# Patient Record
Sex: FEMALE | Race: BLACK OR AFRICAN AMERICAN
[De-identification: names, ages, dates, MRNs, and addresses within clinical notes are randomized per-mention and may not be internally consistent; named-entity substitution may affect disease eponyms.]

---

## 2019-01-29 PROBLEM — Z00.00 ENCOUNTER FOR PREVENTIVE HEALTH EXAMINATION: Status: ACTIVE | Noted: 2019-01-29

## 2019-02-26 ENCOUNTER — APPOINTMENT (OUTPATIENT)
Dept: PLASTIC SURGERY | Facility: CLINIC | Age: 54
End: 2019-02-26

## 2019-09-11 ENCOUNTER — EMERGENCY (EMERGENCY)
Facility: HOSPITAL | Age: 54
LOS: 1 days | Discharge: HOME | End: 2019-09-11
Attending: EMERGENCY MEDICINE | Admitting: EMERGENCY MEDICINE
Payer: COMMERCIAL

## 2019-09-11 VITALS
TEMPERATURE: 98 F | HEART RATE: 87 BPM | RESPIRATION RATE: 18 BRPM | DIASTOLIC BLOOD PRESSURE: 74 MMHG | SYSTOLIC BLOOD PRESSURE: 108 MMHG | OXYGEN SATURATION: 98 %

## 2019-09-11 VITALS
DIASTOLIC BLOOD PRESSURE: 87 MMHG | SYSTOLIC BLOOD PRESSURE: 126 MMHG | RESPIRATION RATE: 18 BRPM | TEMPERATURE: 98 F | HEART RATE: 79 BPM | OXYGEN SATURATION: 98 % | WEIGHT: 190.04 LBS

## 2019-09-11 DIAGNOSIS — M25.532 PAIN IN LEFT WRIST: ICD-10-CM
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
## 2019-09-11 DIAGNOSIS — M25.531 PAIN IN RIGHT WRIST: ICD-10-CM
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
## 2019-09-11 DIAGNOSIS — M54.5 LOW BACK PAIN: ICD-10-CM
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
## 2019-09-11 DIAGNOSIS — R51 HEADACHE: ICD-10-CM

## 2019-09-11 PROCEDURE — 99284 EMERGENCY DEPT VISIT MOD MDM: CPT

## 2019-09-11 PROCEDURE — 70450 CT HEAD/BRAIN W/O DYE: CPT | Mod: 26

## 2019-09-11 RX ORDER — IBUPROFEN 200 MG
800 TABLET ORAL ONCE
Refills: 0 | Status: COMPLETED | OUTPATIENT
Start: 2019-09-11 | End: 2019-09-11

## 2019-09-11 RX ORDER — ACETAMINOPHEN 500 MG
975 TABLET ORAL ONCE
Refills: 0 | Status: COMPLETED | OUTPATIENT
Start: 2019-09-11 | End: 2019-09-11

## 2019-09-11 RX ORDER — IBUPROFEN 200 MG
1 TABLET ORAL
Qty: 21 | Refills: 0
Start: 2019-09-11 | End: 2019-09-17

## 2019-09-11 RX ADMIN — Medication 800 MILLIGRAM(S): at 22:08

## 2019-09-11 RX ADMIN — Medication 800 MILLIGRAM(S): at 22:09

## 2019-09-11 RX ADMIN — Medication 975 MILLIGRAM(S): at 19:54

## 2019-09-11 RX ADMIN — Medication 975 MILLIGRAM(S): at 21:59

## 2019-09-11 NOTE — ED PROVIDER NOTE - PATIENT PORTAL LINK FT
You can access the FollowMyHealth Patient Portal offered by [facility] by registering at the following website: [email or web address]. By joining [email or web address]’s FollowMyHealth portal, you will also be able to view your health information using other applications (apps) compatible with our system.

## 2019-09-11 NOTE — ED PROVIDER NOTE - NEUROLOGICAL, MLM
Alert and oriented, no focal deficits, no motor or sensory deficits. CNs intact.  No pronator drift. Sensation intact to all extremities. 5/5 strength to all extremities.

## 2019-09-11 NOTE — ED ADULT NURSE NOTE - OBJECTIVE STATEMENT
pt is a 53 y/o female presents to the ED s/p fall sustaining injury to left sided body. c/o left sided head & left wrist pain

## 2019-09-11 NOTE — ED ADULT NURSE NOTE - NSIMPLEMENTINTERV_GEN_ALL_ED
Implemented All Universal Safety Interventions:  [place] to call system. Call bell, personal items and telephone within reach. Instruct patient to call for assistance. Room bathroom lighting operational. Non-slip footwear when patient is off stretcher. Physically safe environment: no spills, clutter or unnecessary equipment. Stretcher in lowest position, wheels locked, appropriate side rails in place.

## 2019-09-11 NOTE — ED PROVIDER NOTE - MUSCULOSKELETAL, MLM
Spine appears normal, range of motion is not limited, no muscle or joint tenderness. No wrist swelling or tenderness.

## 2019-09-11 NOTE — ED PROVIDER NOTE - CARE PROVIDER_API CALL
Vishnu Khan)  Neurology; Neuromuscular Medicine  [address], [address] [place] [address]  Phone: [phone]  Fax: [phone]  Follow Up Time:
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
## 2019-09-11 NOTE — ED PROVIDER NOTE - NS_ATTENDINGSCRIBE_ED_ALL_ED
Is This A New Presentation, Or A Follow-Up?: Skin Lesion What Type Of Note Output Would You Prefer (Optional)?: Standard Output Has Your Skin Lesion Been Treated?: not been treated I personally performed the service described in the documentation recorded by the scribe in my presence, and it accurately and completely records my words and actions.

## 2019-09-11 NOTE — ED PROVIDER NOTE - CHPI ED SYMPTOMS NEG
no loss of consciousness/no numbness/no nausea, neck pain, vision changes, tingling, dizziness/no vomiting
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
## 2019-09-11 NOTE — ED ADULT NURSE NOTE - CHPI ED NUR SYMPTOMS NEG
no loss of consciousness/no numbness/no tingling/no confusion/no deformity/no fever/no vomiting/no weakness

## 2023-12-22 ENCOUNTER — EMERGENCY (EMERGENCY)
Facility: HOSPITAL | Age: 58
LOS: 1 days | Discharge: HOME | End: 2023-12-22
Attending: EMERGENCY MEDICINE | Admitting: EMERGENCY MEDICINE
Payer: OTHER MISCELLANEOUS

## 2023-12-22 VITALS
OXYGEN SATURATION: 98 % | TEMPERATURE: 98 F | HEART RATE: 80 BPM | RESPIRATION RATE: 16 BRPM | DIASTOLIC BLOOD PRESSURE: 90 MMHG | SYSTOLIC BLOOD PRESSURE: 148 MMHG

## 2023-12-22 VITALS
HEART RATE: 73 BPM | DIASTOLIC BLOOD PRESSURE: 96 MMHG | RESPIRATION RATE: 18 BRPM | SYSTOLIC BLOOD PRESSURE: 145 MMHG | TEMPERATURE: 98 F | OXYGEN SATURATION: 98 %

## 2023-12-22 PROBLEM — M54.30 SCIATICA, UNSPECIFIED SIDE: Chronic | Status: ACTIVE | Noted: 2019-09-11

## 2023-12-22 PROCEDURE — 99284 EMERGENCY DEPT VISIT MOD MDM: CPT

## 2023-12-22 PROCEDURE — 73030 X-RAY EXAM OF SHOULDER: CPT | Mod: 26,LT

## 2023-12-22 PROCEDURE — 73060 X-RAY EXAM OF HUMERUS: CPT | Mod: 26,LT

## 2023-12-22 RX ORDER — KETOROLAC TROMETHAMINE 30 MG/ML
30 SYRINGE (ML) INJECTION ONCE
Refills: 0 | Status: DISCONTINUED | OUTPATIENT
Start: 2023-12-22 | End: 2023-12-22

## 2023-12-22 RX ORDER — LIDOCAINE 4 G/100G
1 CREAM TOPICAL ONCE
Refills: 0 | Status: COMPLETED | OUTPATIENT
Start: 2023-12-22 | End: 2023-12-22

## 2023-12-22 RX ADMIN — Medication 30 MILLIGRAM(S): at 16:03

## 2023-12-22 RX ADMIN — LIDOCAINE 1 PATCH: 4 CREAM TOPICAL at 16:03

## 2023-12-22 NOTE — ED PROVIDER NOTE - PATIENT PORTAL LINK FT
You can access the FollowMyHealth Patient Portal offered by [facility] by registering at the following website: [email or web address]. By joining [employer]’s FollowMyHealth portal, you will also be able to view your health information using other applications (apps) compatible with our system. You can access the FollowMyHealth Patient Portal offered by [facility] by registering at the following website: [email or web address]. By joining [employer]’s FollowMyHealth portal, you will also be able to view your health information using other applications (apps) compatible with our system.

## 2023-12-22 NOTE — ED PROVIDER NOTE - CLINICAL SUMMARY MEDICAL DECISION MAKING FREE TEXT BOX
57 yo F presented to the ED for L arm pain sp fall. normal ROM without bony tenderness making fracture unlikely however, will do screening xray. Will give toradol for pain and lidocaine patch.

## 2023-12-22 NOTE — ED ADULT NURSE NOTE - NSFALLUNIVINTERV_ED_ALL_ED
Bed/Stretcher in lowest position, wheels locked, appropriate side rails in place/Call bell, personal items and telephone in reach/Instruct patient to call for assistance before getting out of bed/chair/stretcher/Non-slip footwear applied when patient is off stretcher/[place] to call system/Physically safe environment - no spills, clutter or unnecessary equipment/Purposeful proactive rounding/Room/bathroom lighting operational, light cord in reach Bed/Stretcher in lowest position, wheels locked, appropriate side rails in place/Call bell, personal items and telephone in reach/Instruct patient to call for assistance before getting out of bed/chair/stretcher/Non-slip footwear applied when patient is off stretcher/[place] to call system/Physically safe environment - no spills, clutter or unnecessary equipment/Purposeful proactive rounding/Room/bathroom lighting operational, light cord in reach

## 2023-12-22 NOTE — ED PROVIDER NOTE - NSFOLLOWUPINSTRUCTIONS_ED_ALL_ED_FT
Arm Pain    WHAT YOU NEED TO KNOW:    Your arm pain may be caused by a number of conditions. Examples include arthritis, nerve problems, or an awkward position while you sleep. X-rays did not show a broken bone in your arm or wrist. Arm pain may be a sign of a serious condition that needs immediate care, such as a heart attack.    DISCHARGE INSTRUCTIONS:    Call 911 for any of the following: You have any of the following signs of a heart attack:     Squeezing, pressure, or pain in your chest that lasts longer than 5 minutes or returns      Discomfort or pain in your back, neck, jaw, stomach, or arm       Trouble breathing or a fast, fluttery heartbeat      Nausea or vomiting      Lightheadedness or a sudden cold sweat, especially with chest pain or trouble breathing    Return to the emergency department if:     You have severe pain, or pain that spreads from your arm to other areas.      You have swelling, tingling, or numbness in your hand or fingers, or the skin turns blue.      You cannot move your arm.    Contact your healthcare provider if:     You have questions or concerns about your condition or care.        Medicines: You may need any of the following:     Prescription pain medicine may be given. Ask how to take this medicine safely.      NSAIDs, such as ibuprofen, help decrease swelling, pain, and fever. This medicine is available with or without a doctor's order. NSAIDs can cause stomach bleeding or kidney problems in certain people. If you take blood thinner medicine, always ask your healthcare provider if NSAIDs are safe for you. Always read the medicine label and follow directions.      Take your medicine as directed. Contact your healthcare provider if you think your medicine is not helping or if you have side effects. Tell him or her if you are allergic to any medicine. Keep a list of the medicines, vitamins, and herbs you take. Include the amounts, and when and why you take them. Bring the list or the pill bottles to follow-up visits. Carry your medicine list with you in case of an emergency.    Self-care:     Rest your arm as directed. A sling may be used to keep your arm from moving while it heals.      Apply ice as directed. Ice helps decrease pain and swelling. Ice may also help prevent tissue damage. Use an ice pack, or put crushed ice in a plastic bag. Cover it with a towel. Apply it to your arm for 20 minutes every few hours, or as directed. Ask how many times to apply ice each day, and for how many days.      Elevate your arm above the level of your heart as often as you can. This will help decrease swelling and pain. Prop your arm on pillows or blankets to keep the area elevated comfortably.      Adjust your position if you work in front of a computer. You may need arm or wrist supports or change the height of your chair.       Keep a pain record. Write down when your pain happens and how severe it is. Include any other symptoms you have with your pain. A record will help you keep track of pain cycles. Bring the record with you to your follow-up visits. It may also help your healthcare provider find out what is causing your pain.    Follow up with your healthcare provider as directed: You may need physical therapy. You may need to see an orthopedic specialist. Write down your questions so you remember to ask them during your visits.       © Copyright [employer] 2019 All illustrations and images included in CareNotes are the copyrighted property of [employer] or [employer]. Arm Pain    WHAT YOU NEED TO KNOW:    Your arm pain may be caused by a number of conditions. Examples include arthritis, nerve problems, or an awkward position while you sleep. X-rays did not show a broken bone in your arm or wrist. Arm pain may be a sign of a serious condition that needs immediate care, such as a heart attack.    DISCHARGE INSTRUCTIONS:    Call 911 for any of the following: You have any of the following signs of a heart attack:     Squeezing, pressure, or pain in your chest that lasts longer than 5 minutes or returns      Discomfort or pain in your back, neck, jaw, stomach, or arm       Trouble breathing or a fast, fluttery heartbeat      Nausea or vomiting      Lightheadedness or a sudden cold sweat, especially with chest pain or trouble breathing    Return to the emergency department if:     You have severe pain, or pain that spreads from your arm to other areas.      You have swelling, tingling, or numbness in your hand or fingers, or the skin turns blue.      You cannot move your arm.    Contact your healthcare provider if:     You have questions or concerns about your condition or care.        Medicines: You may need any of the following:     Prescription pain medicine may be given. Ask how to take this medicine safely.      NSAIDs, such as ibuprofen, help decrease swelling, pain, and fever. This medicine is available with or without a doctor's order. NSAIDs can cause stomach bleeding or kidney problems in certain people. If you take blood thinner medicine, always ask your healthcare provider if NSAIDs are safe for you. Always read the medicine label and follow directions.      Take your medicine as directed. Contact your healthcare provider if you think your medicine is not helping or if you have side effects. Tell him or her if you are allergic to any medicine. Keep a list of the medicines, vitamins, and herbs you take. Include the amounts, and when and why you take them. Bring the list or the pill bottles to follow-up visits. Carry your medicine list with you in case of an emergency.    Self-care:     Rest your arm as directed. A sling may be used to keep your arm from moving while it heals.      Apply ice as directed. Ice helps decrease pain and swelling. Ice may also help prevent tissue damage. Use an ice pack, or put crushed ice in a plastic bag. Cover it with a towel. Apply it to your arm for 20 minutes every few hours, or as directed. Ask how many times to apply ice each day, and for how many days.      Elevate your arm above the level of your heart as often as you can. This will help decrease swelling and pain. Prop your arm on pillows or blankets to keep the area elevated comfortably.      Adjust your position if you work in front of a computer. You may need arm or wrist supports or change the height of your chair.       Keep a pain record. Write down when your pain happens and how severe it is. Include any other symptoms you have with your pain. A record will help you keep track of pain cycles. Bring the record with you to your follow-up visits. It may also help your healthcare provider find out what is causing your pain.    Follow up with your healthcare provider as directed: You may need physical therapy. You may need to see an orthopedic specialist. Write down your questions so you remember to ask them during your visits.       © Copyright [employer] 2019 All illustrations and images included in CareNotes are the copyrighted property of [employer] or [employer].

## 2023-12-22 NOTE — ED PROVIDER NOTE - OBJECTIVE STATEMENT
58-year-old female with no past medical history presents emergency department after she tripped over a cord and fell onto her left  side onto her shoulder.  Since then she has been having left shoulder arm and neck pain.  She did not hit her head.  Patient's not taking anything for pain.  No numbness no tingling.  No other injuries.

## 2023-12-22 NOTE — ED PROVIDER NOTE - WR ORDER ID 2
Pharyngitis  Salt water gargles  Increase fluid intake  Follow up with PCP in 3-5 days  Proceed to  ER if symptoms worsen  Pharyngitis in Children   WHAT YOU NEED TO KNOW:   Pharyngitis, or sore throat, is inflammation of the tissues and structures in your child's pharynx (throat)  Pharyngitis may be caused by a bacterial or viral infection  DISCHARGE INSTRUCTIONS:   Seek care immediately if:   · Your child suddenly has trouble breathing or turns blue  · Your child has swelling or pain in his or her jaw  · Your child has voice changes, or it is hard to understand his or her speech  · Your child has a stiff neck  · Your child is urinating less than usual or has fewer diapers than usual      · Your child has increased weakness or fatigue  · Your child has pain on one side of the throat that is much worse than the other side  Contact your child's healthcare provider if:   · Your child's symptoms return or his symptoms do not get better or get worse  · Your child has a rash  He or she may also have reddish cheeks and a red, swollen tongue  · Your child has new ear pain, headaches, or pain around his or her eyes  · Your child pauses in breathing when he or she sleeps  · You have questions or concerns about your child's condition or care  Medicines: Your child may need any of the following:  · Acetaminophen  decreases pain  It is available without a doctor's order  Ask how much to give your child and how often to give it  Follow directions  Acetaminophen can cause liver damage if not taken correctly  · NSAIDs , such as ibuprofen, help decrease swelling, pain, and fever  This medicine is available with or without a doctor's order  NSAIDs can cause stomach bleeding or kidney problems in certain people  If your child takes blood thinner medicine, always ask if NSAIDs are safe for him  Always read the medicine label and follow directions   Do not give these medicines to children under 6 months of age without direction from your child's healthcare provider  · Antibiotics  treat a bacterial infection  · Do not give aspirin to children under 25years of age  Your child could develop Reye syndrome if he takes aspirin  Reye syndrome can cause life-threatening brain and liver damage  Check your child's medicine labels for aspirin, salicylates, or oil of wintergreen  · Give your child's medicine as directed  Contact your child's healthcare provider if you think the medicine is not working as expected  Tell him or her if your child is allergic to any medicine  Keep a current list of the medicines, vitamins, and herbs your child takes  Include the amounts, and when, how, and why they are taken  Bring the list or the medicines in their containers to follow-up visits  Carry your child's medicine list with you in case of an emergency  Manage your child's pharyngitis:   · Have your child rest  as much as possible  · Give your child plenty of liquids  so he or she does not get dehydrated  Give your child liquids that are easy to swallow and will soothe his or her throat  · Soothe your child's throat  If your child can gargle, give him or her ¼ of a teaspoon of salt mixed with 1 cup of warm water to gargle  If your child is 12 years or older, give him or her throat lozenges to help decrease throat pain  · Use a cool mist humidifier  to increase air moisture in your home  This may make it easier for your child to breathe and help decrease his or her cough  Help prevent the spread of pharyngitis:  Wash your hands and your child's hands often  Keep your child away from other people while he or she is still contagious  Ask your child's healthcare provider how long your child is contagious  Do not let your child share food or drinks  Do not let your child share toys or pacifiers  Wash these items with soap and hot water  When to return to school or :   Your child may return to  or school when his or her symptoms go away  Follow up with your child's healthcare provider as directed:  Write down your questions so you remember to ask them during your child's visits  © 2017 [address] [employer] Information is for End User's use only and may not be sold, redistributed or otherwise used for commercial purposes  All illustrations and images included in CareNotes® are the copyrighted property of [employer] M , Inc  or [employer]  The above information is an  only  It is not intended as medical advice for individual conditions or treatments  Talk to your doctor, nurse or pharmacist before following any medical regimen to see if it is safe and effective for you  [id number]

## 2023-12-22 NOTE — ED PROVIDER NOTE - PHYSICAL EXAMINATION
Const: NAD  Eyes: PERRL, no conjunctival injection  HENT:  Neck supple without meningismus   CV: RRR, Warm, well-perfused extremities  RESP: CTA B/L, no tachypnea   MSK: No gross deformities appreciated, Normal range of motion of left shoulder.  No bony point tenderness.  Patient has left paraspinal tenderness and left trapezius muscle tenderness.  No C-spine T-spine L-spine tenderness or step-offs.  Skin: Warm, dry. No rashes  Neuro: Alert, CNs II-XII grossly intact. Sensation and motor function of extremities grossly intact.  Psych: Appropriate mood and affect.

## 2023-12-22 NOTE — ED ADULT TRIAGE NOTE - CHIEF COMPLAINT QUOTE
BIBEMS from work. Pt states trip/fall today. Denies hs or loc. Pt states increased left shoulder pain. full rom. Denies sx on shoulder.

## 2023-12-22 NOTE — ED ADULT NURSE NOTE - OBJECTIVE STATEMENT
57 y/o F here after a trip/fall today. Denies hs or loc. Pt states increased left shoulder pain. full rom. Denies sx on shoulder. 59 y/o F here after a trip/fall today. Denies hs or loc. Pt states increased left shoulder pain. full rom. Denies sx on shoulder.

## 2023-12-26 DIAGNOSIS — M25.512 PAIN IN LEFT SHOULDER: ICD-10-CM

## 2023-12-26 DIAGNOSIS — M54.2 CERVICALGIA: ICD-10-CM
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
